# Patient Record
Sex: MALE | HISPANIC OR LATINO | Employment: UNEMPLOYED | URBAN - METROPOLITAN AREA
[De-identification: names, ages, dates, MRNs, and addresses within clinical notes are randomized per-mention and may not be internally consistent; named-entity substitution may affect disease eponyms.]

---

## 2024-05-16 ENCOUNTER — HOSPITAL ENCOUNTER (EMERGENCY)
Facility: CLINIC | Age: 29
Discharge: HOME | End: 2024-05-16
Attending: EMERGENCY MEDICINE | Admitting: EMERGENCY MEDICINE

## 2024-05-16 ENCOUNTER — APPOINTMENT (OUTPATIENT)
Dept: GENERAL RADIOLOGY | Facility: CLINIC | Age: 29
End: 2024-05-16
Attending: PHYSICIAN ASSISTANT

## 2024-05-16 VITALS
BODY MASS INDEX: 21.62 KG/M2 | HEIGHT: 69 IN | HEART RATE: 69 BPM | SYSTOLIC BLOOD PRESSURE: 106 MMHG | DIASTOLIC BLOOD PRESSURE: 66 MMHG | OXYGEN SATURATION: 98 % | TEMPERATURE: 98 F | WEIGHT: 146 LBS | RESPIRATION RATE: 16 BRPM

## 2024-05-16 DIAGNOSIS — S61.212A LACERATION OF RIGHT MIDDLE FINGER WITHOUT FOREIGN BODY WITHOUT DAMAGE TO NAIL, INITIAL ENCOUNTER: ICD-10-CM

## 2024-05-16 PROCEDURE — 99283 EMERGENCY DEPT VISIT LOW MDM: CPT | Mod: 25 | Performed by: EMERGENCY MEDICINE

## 2024-05-16 PROCEDURE — 29130 APPL FINGER SPLINT STATIC: CPT | Performed by: EMERGENCY MEDICINE

## 2024-05-16 PROCEDURE — 99284 EMERGENCY DEPT VISIT MOD MDM: CPT | Mod: FS | Performed by: EMERGENCY MEDICINE

## 2024-05-16 PROCEDURE — 73130 X-RAY EXAM OF HAND: CPT | Mod: RT

## 2024-05-16 PROCEDURE — 12001 RPR S/N/AX/GEN/TRNK 2.5CM/<: CPT | Mod: F7 | Performed by: EMERGENCY MEDICINE

## 2024-05-16 PROCEDURE — 90715 TDAP VACCINE 7 YRS/> IM: CPT | Performed by: PHYSICIAN ASSISTANT

## 2024-05-16 PROCEDURE — 250N000009 HC RX 250: Performed by: PHYSICIAN ASSISTANT

## 2024-05-16 PROCEDURE — 90471 IMMUNIZATION ADMIN: CPT | Performed by: PHYSICIAN ASSISTANT

## 2024-05-16 PROCEDURE — 250N000011 HC RX IP 250 OP 636: Performed by: PHYSICIAN ASSISTANT

## 2024-05-16 RX ORDER — CEPHALEXIN 500 MG/1
500 CAPSULE ORAL 4 TIMES DAILY
Qty: 14 CAPSULE | Refills: 0 | Status: SHIPPED | OUTPATIENT
Start: 2024-05-16 | End: 2024-05-20

## 2024-05-16 RX ORDER — LIDOCAINE HYDROCHLORIDE 10 MG/ML
10 INJECTION, SOLUTION INFILTRATION; PERINEURAL ONCE
Status: COMPLETED | OUTPATIENT
Start: 2024-05-16 | End: 2024-05-16

## 2024-05-16 RX ORDER — CEFTRIAXONE 1 G/1
1 INJECTION, POWDER, FOR SOLUTION INTRAMUSCULAR; INTRAVENOUS ONCE
Status: DISCONTINUED | OUTPATIENT
Start: 2024-05-16 | End: 2024-05-16

## 2024-05-16 RX ADMIN — CLOSTRIDIUM TETANI TOXOID ANTIGEN (FORMALDEHYDE INACTIVATED), CORYNEBACTERIUM DIPHTHERIAE TOXOID ANTIGEN (FORMALDEHYDE INACTIVATED), BORDETELLA PERTUSSIS TOXOID ANTIGEN (GLUTARALDEHYDE INACTIVATED), BORDETELLA PERTUSSIS FILAMENTOUS HEMAGGLUTININ ANTIGEN (FORMALDEHYDE INACTIVATED), BORDETELLA PERTUSSIS PERTACTIN ANTIGEN, AND BORDETELLA PERTUSSIS FIMBRIAE 2/3 ANTIGEN 0.5 ML: 5; 2; 2.5; 5; 3; 5 INJECTION, SUSPENSION INTRAMUSCULAR at 21:32

## 2024-05-16 RX ADMIN — LIDOCAINE HYDROCHLORIDE 10 ML: 10 INJECTION, SOLUTION INFILTRATION; PERINEURAL at 22:12

## 2024-05-16 ASSESSMENT — COLUMBIA-SUICIDE SEVERITY RATING SCALE - C-SSRS
2. HAVE YOU ACTUALLY HAD ANY THOUGHTS OF KILLING YOURSELF IN THE PAST MONTH?: NO
6. HAVE YOU EVER DONE ANYTHING, STARTED TO DO ANYTHING, OR PREPARED TO DO ANYTHING TO END YOUR LIFE?: NO
1. IN THE PAST MONTH, HAVE YOU WISHED YOU WERE DEAD OR WISHED YOU COULD GO TO SLEEP AND NOT WAKE UP?: NO

## 2024-05-16 ASSESSMENT — ACTIVITIES OF DAILY LIVING (ADL)
ADLS_ACUITY_SCORE: 33
ADLS_ACUITY_SCORE: 35
ADLS_ACUITY_SCORE: 35

## 2024-05-17 NOTE — ED PROVIDER NOTES
"ED Provider Note  [facility]      History     Chief Complaint   Patient presents with    Laceration     Finger injury at work on right middle finger, metal pole smashed finger leading to cut. Endorsing numbness in that finger. Bleeding controlled       Laceration    28 LHD M pmhx laceration to proximal right finger sustained at work just PTA.  Patient reports that he was trying small medication), when it tipped landing on his hand, crushing it between a table and a rock.  ROM intact with pain.  Endorses paresthesias of the digit.  Has not attempted anything to clean the wound.  Unsure of last tetanus.    Past Medical History  No past medical history on file.  No past surgical history on file.  cephALEXin (KEFLEX) 500 MG capsule      No Known Allergies  Family History  No family history on file.  Social History          A medically appropriate review of systems was performed with pertinent positives and negatives noted in the HPI, and all other systems negative.    Physical Exam   BP: 112/65  Pulse: 89  Temp: 98.3  F (36.8  C)  Resp: 16  Height: 175.3 cm (5' 9\")  Weight: 66.2 kg (146 lb)  SpO2: 98 %  Physical Exam  Constitutional:       General: He is not in acute distress.     Appearance: Normal appearance. He is not toxic-appearing.   HENT:      Head: Atraumatic.   Eyes:      Conjunctiva/sclera: Conjunctivae normal.   Cardiovascular:      Rate and Rhythm: Normal rate.   Pulmonary:      Effort: Pulmonary effort is normal.   Musculoskeletal:        Hands:       Cervical back: Neck supple.   Skin:     General: Skin is warm.   Neurological:      Mental Status: He is alert.           ED Course, Procedures, & Data      [facility]    -Laceration Repair    Date/Time: 5/16/2024 10:38 PM    Performed by: Wil Leigh PA-C  Authorized by: Wil Leigh PA-C    Risks, benefits and alternatives discussed.      ANESTHESIA (see MAR for exact " dosages):     Anesthesia method:  Nerve block    Block location:  Right 3rd digit    Block needle gauge:  27 G    Block anesthetic:  Lidocaine 1% w/o epi    Block injection procedure:  Anatomic landmarks identified, introduced needle, incremental injection, negative aspiration for blood and anatomic landmarks palpated    Block outcome:  Anesthesia achieved    LACERATION DETAILS     Location:  Finger    Finger location:  R long finger    Length (cm):  2    REPAIR TYPE:     Repair type:  Intermediate    EXPLORATION:     Hemostasis achieved with:  Direct pressure    Wound exploration: wound explored through full range of motion and entire depth of wound probed and visualized      Wound extent: no foreign body, no signs of injury, no nerve damage, no tendon damage and no underlying fracture      TREATMENT:     Area cleansed with:  Saline    Amount of cleaning:  Extensive    Irrigation solution:  Sterile water    Irrigation volume:  2L    Irrigation method:  Syringe    Foreign body removal: n/a.      SKIN REPAIR     Repair method:  Sutures    Suture size:  4-0    Suture material:  Nylon    Suture technique:  Simple interrupted    Number of sutures:  8    POST-PROCEDURE DETAILS     Dressing:  Antibiotic ointment, adhesive bandage and splint for protection      PROCEDURE    Patient Tolerance:  Patient tolerated the procedure well with no immediate complications                 Results for orders placed or performed during the hospital encounter of 05/16/24   XR Hand Right G/E 3 Views     Status: None    Narrative    EXAM: XR HAND RIGHT G/E 3 VIEWS  LOCATION: [facility]  DATE: 5/16/2024    INDICATION: Pain after injury, laceration third finger.  COMPARISON: None.      Impression    IMPRESSION: The right hand is negative for fracture or dislocation. No foreign body identified.     Medications   pharmacy alert - intermittent dosing (has no administration in time range)   Tdap  (tetanus-diphtheria-acell pertussis) (ADACEL) injection 0.5 mL (0.5 mLs Intramuscular $Given 5/16/24 8284)   lidocaine 1 % injection 10 mL (10 mLs Intradermal $Given 5/16/24 8854)     Labs Ordered and Resulted from Time of ED Arrival to Time of ED Departure - No data to display  XR Hand Right G/E 3 Views   Final Result   IMPRESSION: The right hand is negative for fracture or dislocation. No foreign body identified.             Critical care was not performed.     Medical Decision Making  The patient's presentation was of low complexity (an acute and uncomplicated illness or injury).    The patient's evaluation involved:  review of external note(s) from 1 sources (vaccination record)  ordering and/or review of 1 test(s) in this encounter (see separate area of note for details)  independent interpretation of testing performed by another health professional (xray)    The patient's management necessitated moderate risk (prescription drug management including medications given in the ED) and moderate risk (a decision regarding minor procedure (laceration repair) with risk factors of none).    Assessment & Plan    28 LHD M pmhx laceration to proximal right finger sustained at work just PTA.  Finger crush between a metal pole and a rock.  ROM intact with pain.  Endorses paresthesias of the digit.  Has not attempted anything to clean the wound.  Unsure of last tetanus.      On exam, triangularly shaped laceration, with obvious tendon exposure.  Moved through full ROM, without tendon injury appreciated, however.  Active ROM in all planes.  Neurovascularly intact.  X-ray obtained to rule out fracture or foreign body and negative. Tdap updated.  Wound copiously irrigated and closed with 8 sutures as detailed in procedure note above.  Patient discharged with prophylactic cephalexin, and strict infectious ER return precautions.  Suture removal in ~10 days.    --    ED Attending Physician Attestation    I Ann-Marie Booker MD,  cared for this patient with the Advanced Practice Provider (ELIZABETH). I have performed a history and physical examination of the patient independent of the ELIZABETH. I reviewed the ELIZABETH's documentation above and agree with the documented findings and plan of care. I personally provided a substantive portion of the care for this patient, including the complete Medical Decision Making. Please see the ELIZABETH's documentation for full details.    Summary of HPI, PE, ED Course   Patient is a 28 year old male evaluated in the emergency department for hand laceration.  X-rays negative for fracture, no evidence of tendon laceration on exam and full range of motion is present.  Wound was thoroughly irrigated and repaired, tetanus updated.  Ortho referral placed for follow-up and suture removal.      Ann-Marie Booker MD  Emergency Medicine       I have reviewed the nursing notes. I have reviewed the findings, diagnosis, plan and need for follow up with the patient.    New Prescriptions    CEPHALEXIN (KEFLEX) 500 MG CAPSULE    Take 1 capsule (500 mg) by mouth 4 times daily for 4 days       Final diagnoses:   Laceration of right middle finger without foreign body without damage to nail, initial encounter         Wil Leigh PA-C  [facility] EMERGENCY DEPARTMENT  5/16/2024     Wil Leigh PA-C  05/16/24 1137       Ann-Marie Booker MD  05/16/24 1401

## 2024-05-17 NOTE — ED NOTES
PA sutured, bandaged and splinted finger; no active bleeding noted; discharge instructions reviewed.

## 2024-05-17 NOTE — ED TRIAGE NOTES
Finger injury at work on right middle finger, metal pole smashed finger leading to cut. Endorsing numbness in that finger. Bleeding controlled     Triage Assessment (Adult)       Row Name 05/16/24 2048          Triage Assessment    Airway WDL WDL        Respiratory WDL    Respiratory WDL WDL        Skin Circulation/Temperature WDL    Skin Circulation/Temperature WDL WDL        Cardiac WDL    Cardiac WDL WDL        Peripheral/Neurovascular WDL    Peripheral Neurovascular WDL WDL        Cognitive/Neuro/Behavioral WDL    Cognitive/Neuro/Behavioral WDL WDL